# Patient Record
Sex: FEMALE | Race: WHITE | NOT HISPANIC OR LATINO | URBAN - METROPOLITAN AREA
[De-identification: names, ages, dates, MRNs, and addresses within clinical notes are randomized per-mention and may not be internally consistent; named-entity substitution may affect disease eponyms.]

---

## 2017-03-16 NOTE — PATIENT DISCUSSION
(F05.0049) Nexdtve age-related mclr degn bilateral early dry stage - Assesment : Examination revealed mild age-related macular degeneration. - Plan : Patient advised of condition and to monitor signs and symptoms of changes. Amsler grid given and patient instructed in use.  RTC 1 year exam/MAC OCT

## 2017-03-16 NOTE — PATIENT DISCUSSION
(W81.882) Keratoconjunct sicca, not specified as Sjogren's, bilateral - Assesment : Examination revealed Dry Eye Syndrome - Plan : Monitor for changes. Advised patient to call our office with decreased vision or increased symptoms. Use artificial tears 2-3 times a day for relief.  Use Systane gel OU QHS Final RX for glasses given, Final RX for near given RTC 1 year exam/MAC OCT

## 2017-03-16 NOTE — PATIENT DISCUSSION
(X51.571) Vitreous degeneration, bilateral - Assesment : Examination revealed PVD - Plan : Monitor for changes. Advised patient to call our office with decreased vision or an increase in flashes and/or floaters.

## 2018-03-09 NOTE — PATIENT DISCUSSION
(K25.789) Keratoconjunct sicca, not specified as Sjogren's, bilateral - Assesment : Examination revealed Dry Eye Syndrome - Plan : Monitor for changes. Advised patient to call our office with decreased vision or increased symptoms. Use artificial tears 2-3 times a day for relief.   Start systane gel QHS OU once done with Emycin Start Emycin Onelia QHS OU for 10 nights Rtc as scheduled

## 2018-04-19 NOTE — PATIENT DISCUSSION
(M18.8886) Nonexudative age-related macular degeneration bilateral sebas - Assesment : Examination revealed AMD Dry.- MILD - Plan : Monitor for changes. Advised patient to call our office with decreased vision or increased distortion.  1 YEAR EXAM/ MAC OCT

## 2018-04-19 NOTE — PATIENT DISCUSSION
(P02.126) Keratoconjunct sicca, not specified as Sjogren's, bilateral - Assesment : Examination revealed Dry Eye Syndrome - Plan : Monitor for changes. Advised patient to call our office with decreased vision or increased symptoms.  ATS OU 2-3 X DAY  1 YEAR EXAM

## 2019-04-24 NOTE — PATIENT DISCUSSION
(L17.3854) Nonexudative age-related macular degeneration bilateral sebas - Assesment : Examination revealed Dry ARMD OU, mild OU. Macular OCT today shows no significant change or progression. - Plan : Patient advised to check Amsler Grid regularly (once weekly or more) and use nutraceuticals such as AREDS 2 eye vitamins. Wear sunglasses when outdoors and eat green, leafy vegetables to maintain ocular health. Return to clinic in one year for Macular Fundus Photos and Exam, sooner with problems or changes.

## 2019-04-24 NOTE — PATIENT DISCUSSION
(J13.028) Vitreous degeneration, bilateral - Assesment : Examination revealed PVD OU, stable. - Plan : Monitor for changes.

## 2019-04-24 NOTE — PATIENT DISCUSSION
(Y65.540) Dermatochalasis of left upper eyelid - Assesment : Examination revealed Dermatochalasis. - Plan : Monitor for changes. Advised patient to call our office with decreased vision or increased symptoms. See plan #2.

## 2019-04-24 NOTE — PATIENT DISCUSSION
(M42.768) Dermatochalasis of right upper eyelid - Assesment : Examination revealed Dermatochalasis. Hx of BULB in 2005. Patient bothered only minimally at this time. - Plan : Monitor for changes. Advised patient to call our office with decreased vision or increased symptoms. Recommend to continue monitoring unless patient becomes more bothered.

## 2021-11-09 NOTE — PATIENT DISCUSSION
Advised symptoms sounds like an ocular migraine. Pt stated has noticed jagged lines for several years,.

## 2022-04-04 ENCOUNTER — ESTABLISHED PATIENT (OUTPATIENT)
Dept: URBAN - METROPOLITAN AREA CLINIC 20 | Facility: CLINIC | Age: 37
End: 2022-04-04

## 2022-04-04 DIAGNOSIS — H52.13: ICD-10-CM

## 2022-04-04 PROCEDURE — 92014 COMPRE OPH EXAM EST PT 1/>: CPT

## 2022-04-04 PROCEDURE — 92310E CONTACT LENS 100

## 2022-04-04 ASSESSMENT — VISUAL ACUITY
OD_CC: 20/20
OS_CC: 20/20

## 2022-04-04 ASSESSMENT — TONOMETRY
OS_IOP_MMHG: 16
OD_IOP_MMHG: 17

## 2023-11-21 ENCOUNTER — ESTABLISHED PATIENT (OUTPATIENT)
Dept: URBAN - METROPOLITAN AREA CLINIC 20 | Facility: CLINIC | Age: 38
End: 2023-11-21

## 2023-11-21 DIAGNOSIS — H52.13: ICD-10-CM

## 2023-11-21 PROCEDURE — 99214 OFFICE O/P EST MOD 30 MIN: CPT

## 2023-11-21 ASSESSMENT — KERATOMETRY
OD_AXISANGLE_DEGREES: 6
OS_K2POWER_DIOPTERS: 46.00
OD_K1POWER_DIOPTERS: 44.00
OS_AXISANGLE2_DEGREES: 76
OD_AXISANGLE2_DEGREES: 96
OS_K1POWER_DIOPTERS: 44.00
OS_AXISANGLE_DEGREES: 166
OD_K2POWER_DIOPTERS: 46.00

## 2023-11-21 ASSESSMENT — TONOMETRY
OS_IOP_MMHG: 13
OD_IOP_MMHG: 16

## 2023-11-21 ASSESSMENT — VISUAL ACUITY
OD_CC: 20/20
OS_CC: 20/20
OU_CC: 20/20

## 2025-02-05 ENCOUNTER — COMPREHENSIVE EXAM (OUTPATIENT)
Age: 40
End: 2025-02-05

## 2025-02-05 DIAGNOSIS — H52.223: ICD-10-CM

## 2025-02-05 PROCEDURE — 92310-3 LEVEL 3 SOFT LENS UPDATE

## 2025-02-05 PROCEDURE — 92014 COMPRE OPH EXAM EST PT 1/>: CPT

## 2025-02-05 PROCEDURE — 92015 DETERMINE REFRACTIVE STATE: CPT
